# Patient Record
Sex: MALE | Race: WHITE | NOT HISPANIC OR LATINO
[De-identification: names, ages, dates, MRNs, and addresses within clinical notes are randomized per-mention and may not be internally consistent; named-entity substitution may affect disease eponyms.]

---

## 2023-08-28 ENCOUNTER — NON-APPOINTMENT (OUTPATIENT)
Age: 38
End: 2023-08-28

## 2023-08-28 PROBLEM — Z00.00 ENCOUNTER FOR PREVENTIVE HEALTH EXAMINATION: Status: ACTIVE | Noted: 2023-08-28

## 2023-08-29 ENCOUNTER — APPOINTMENT (OUTPATIENT)
Dept: OTOLARYNGOLOGY | Facility: CLINIC | Age: 38
End: 2023-08-29
Payer: COMMERCIAL

## 2023-08-29 ENCOUNTER — TRANSCRIPTION ENCOUNTER (OUTPATIENT)
Age: 38
End: 2023-08-29

## 2023-08-29 VITALS — BODY MASS INDEX: 21.19 KG/M2 | HEIGHT: 70 IN | WEIGHT: 148 LBS

## 2023-08-29 DIAGNOSIS — Z82.49 FAMILY HISTORY OF ISCHEMIC HEART DISEASE AND OTHER DISEASES OF THE CIRCULATORY SYSTEM: ICD-10-CM

## 2023-08-29 DIAGNOSIS — Z78.9 OTHER SPECIFIED HEALTH STATUS: ICD-10-CM

## 2023-08-29 DIAGNOSIS — Z85.47 PERSONAL HISTORY OF MALIGNANT NEOPLASM OF TESTIS: ICD-10-CM

## 2023-08-29 DIAGNOSIS — H91.21 SUDDEN IDIOPATHIC HEARING LOSS, RIGHT EAR: ICD-10-CM

## 2023-08-29 DIAGNOSIS — H81.09 MENIERE'S DISEASE, UNSPECIFIED EAR: ICD-10-CM

## 2023-08-29 DIAGNOSIS — H81.01 MENIERE'S DISEASE, RIGHT EAR: ICD-10-CM

## 2023-08-29 DIAGNOSIS — H93.291 OTHER ABNORMAL AUDITORY PERCEPTIONS, RIGHT EAR: ICD-10-CM

## 2023-08-29 DIAGNOSIS — Z83.438 FAMILY HISTORY OF OTHER DISORDER OF LIPOPROTEIN METABOLISM AND OTHER LIPIDEMIA: ICD-10-CM

## 2023-08-29 PROCEDURE — 99204 OFFICE O/P NEW MOD 45 MIN: CPT

## 2023-08-29 PROCEDURE — 92504 EAR MICROSCOPY EXAMINATION: CPT

## 2023-08-29 PROCEDURE — 92567 TYMPANOMETRY: CPT

## 2023-08-29 PROCEDURE — 92557 COMPREHENSIVE HEARING TEST: CPT

## 2023-08-29 RX ORDER — RIBOFLAVIN (VITAMIN B2) 50 MG
TABLET ORAL
Refills: 0 | Status: ACTIVE | COMMUNITY

## 2023-08-29 RX ORDER — PREDNISONE 10 MG/1
10 TABLET ORAL
Qty: 18 | Refills: 0 | Status: ACTIVE | COMMUNITY
Start: 2023-08-29 | End: 1900-01-01

## 2023-08-29 RX ORDER — PERPHENAZINE 8 MG
TABLET ORAL
Refills: 0 | Status: ACTIVE | COMMUNITY

## 2023-08-29 RX ORDER — PREDNISONE 50 MG/1
TABLET ORAL
Refills: 0 | Status: ACTIVE | COMMUNITY

## 2023-08-29 RX ORDER — MAGNESIUM OXIDE/MAG AA CHELATE 300 MG
CAPSULE ORAL
Refills: 0 | Status: ACTIVE | COMMUNITY

## 2023-08-29 RX ORDER — TRIAMTERENE 50 MG/1
CAPSULE ORAL
Refills: 0 | Status: ACTIVE | COMMUNITY

## 2023-08-29 NOTE — HISTORY OF PRESENT ILLNESS
[de-identified] : KITA HALL has a history of low-frequency hearing loss and tinnitus in the right ear.  This began suddenly in 2016. No vertigo. This had occurred 1 per year responsive to steroids. Mother: sudden hearing loss as an adult. [FreeTextEntry1] : Increasimg frequency of fluctuations for the past few months.  This is not associated with vertigo or disequilibrium.  He has been treated with oral steroids on several occasions.  He feels that this usually works but is uncertain.  He has also been treated with oral diuretics and antimigraine medication.  He is observing a salt restricted diet. Currently on 60mg prednisone for several days.

## 2023-08-29 NOTE — ASSESSMENT
[FreeTextEntry1] : Fluctuating low-frequency sensorineural hearing loss in the right ear with no vertigo.  This has increased in frequency since its onset several years ago.  It appears to be steroid responsive.  He has been dosed with steroids on several occasions.  There is no current evidence for a migraine phenomenon given the absence of other migraine criteria.  We reviewed this in detail.  I have discussed management strategies in detail.  I have advocated for him to taper off of oral steroids and have provided him with a regimen.  After completing steroids, I have recommended that he undergo blood testing which should be done at least 2 weeks after the steroids are completed.  I have reviewed this with him.  We discussed management strategies including a trial with betahistine.  We also discussed the potential trial with oral antiviral medication.  I have encouraged him to continue on his diet and have recommended follow-up in 3 to 4 weeks.

## 2023-08-29 NOTE — PHYSICAL EXAM
[FreeTextEntry1] : Procedure: Microscopic Ear Exam  Left ear:  Ear canal intact without inflammation or lesion.   Tympanic membrane intact without inflammation.  Right ear:  Ear canal intact without inflammation or lesion.   Tympanic membrane intact without inflammation.  [Midline] : trachea located in midline position [Normal] : no rashes

## 2023-08-29 NOTE — DATA REVIEWED
[de-identified] : In light of the patients current symptoms, Complete audiometry was ordered and completed today. I have interpreted these results and reviewed them in detail with the patient.  Hearing within normal limits bilaterally.  Slight asymmetry noted in the low-frequency.  Word recognition intact bilaterally. [de-identified] : CT report dated May 2023 provided and reviewed [de-identified] : Numerous prior medical records provided and reviewed.  This includes multiple audiograms done over the past 5 years.

## 2023-08-30 PROBLEM — H93.291 ABNORMAL AUDITORY PERCEPTION OF RIGHT EAR: Status: ACTIVE | Noted: 2023-08-30

## 2023-09-07 ENCOUNTER — TRANSCRIPTION ENCOUNTER (OUTPATIENT)
Age: 38
End: 2023-09-07